# Patient Record
(demographics unavailable — no encounter records)

---

## 2025-07-16 NOTE — HISTORY OF PRESENT ILLNESS
[de-identified] : Patient is here today for his thoracic spine. States he went to his PCP and they noticed a curve in his spine, unconfirmed scoliosis in May. Denies radicular pain and numbness/tingling. Patient is not taking pain medication. Patient states he has a dull ache in his low back after playing basketball or lifting at work.  Mother denies family history of scoliosis.

## 2025-07-16 NOTE — DISCUSSION/SUMMARY
[de-identified] : I reviewed patient's radiographs and discussed his condition and treatment options.  I advised obtaining standing scoliosis XRs.  In the interim, start PT to work on posture, core strengthening, and stretching.  Follow up in 4 weeks.  Patient and his mother voiced understanding and agreement with the plan.